# Patient Record
Sex: FEMALE | Race: WHITE | NOT HISPANIC OR LATINO | Employment: PART TIME | URBAN - METROPOLITAN AREA
[De-identification: names, ages, dates, MRNs, and addresses within clinical notes are randomized per-mention and may not be internally consistent; named-entity substitution may affect disease eponyms.]

---

## 2021-05-15 ENCOUNTER — HOSPITAL ENCOUNTER (EMERGENCY)
Facility: HOSPITAL | Age: 36
Discharge: HOME | End: 2021-05-15
Attending: EMERGENCY MEDICINE | Admitting: EMERGENCY MEDICINE
Payer: COMMERCIAL

## 2021-05-15 VITALS
WEIGHT: 134 LBS | TEMPERATURE: 98.5 F | RESPIRATION RATE: 18 BRPM | OXYGEN SATURATION: 99 % | DIASTOLIC BLOOD PRESSURE: 65 MMHG | SYSTOLIC BLOOD PRESSURE: 123 MMHG | HEART RATE: 103 BPM

## 2021-05-15 DIAGNOSIS — R09.89 THROAT FULLNESS: Primary | ICD-10-CM

## 2021-05-15 DIAGNOSIS — R13.10 DIFFICULTY SWALLOWING: ICD-10-CM

## 2021-05-15 DIAGNOSIS — R05.9 COUGH: ICD-10-CM

## 2021-05-15 PROCEDURE — 99284 EMERGENCY DEPT VISIT MOD MDM: CPT | Performed by: EMERGENCY MEDICINE

## 2021-05-15 PROCEDURE — 99283 EMERGENCY DEPT VISIT LOW MDM: CPT

## 2021-05-15 RX ORDER — BENZONATATE 100 MG/1
100 CAPSULE ORAL EVERY 8 HOURS
Qty: 21 CAPSULE | Refills: 0 | Status: SHIPPED | OUTPATIENT
Start: 2021-05-15 | End: 2021-05-22

## 2021-05-15 RX ORDER — BENZONATATE 100 MG/1
100 CAPSULE ORAL EVERY 8 HOURS
Qty: 21 CAPSULE | Refills: 0 | Status: SHIPPED | OUTPATIENT
Start: 2021-05-15 | End: 2021-05-15 | Stop reason: SDUPTHER

## 2021-05-15 RX ORDER — BENZONATATE 100 MG/1
100 CAPSULE ORAL ONCE
Status: COMPLETED | OUTPATIENT
Start: 2021-05-15 | End: 2021-05-15

## 2021-05-15 RX ADMIN — BENZONATATE 100 MG: 100 CAPSULE ORAL at 17:04

## 2021-05-15 RX ADMIN — LIDOCAINE HYDROCHLORIDE 10 ML: 20 SOLUTION ORAL; TOPICAL at 17:04

## 2021-05-15 NOTE — Clinical Note
Ktrandy Bridges was seen and treated in our emergency department on 5/15/2021  Diagnosis:     [facility]    She may return on this date: If you have any questions or concerns, please don't hesitate to call        Bri Birmingham MD    ______________________________           _______________          _______________  Hospital Representative                              Date                                Time

## 2021-05-15 NOTE — ED PROVIDER NOTES
History  Chief Complaint   Patient presents with    Difficulty Swallowing     Pt reports approximately 30 minutes PTA she was coughing hard and since then is having difficulty swallowing  Pt denies introduction of new food/medication      HPI  Patient is a 48-year-old otherwise healthy female presenting for evaluation of throat foreign body sensation and difficulty swallowing starting about 30 minutes prior to arrival in the emergency department  Patient states that for about the last week she has had rhinorrhea, cough, scratchy throat  Patient believes that her symptoms are consistent with seasonal allergies, has been taking Claritin, DayQuil, Flonase at home with limited relief, additionally was prescribed a Z-Pavel a few days ago which she has been taking  Patient states that today she had a particularly bad coughing fit and after this felt fullness in the back of her throat, difficulty swallowing, anxiety  Patient states that her symptoms have been intermittent since that time, denies significant shortness of breath, has been clearing secretions, was able to drink water in her car without issue  Patient states that her symptoms started a bit after swallowing DayQuil  Patient denies any component of chest pain, focal weakness or numbness of any other part of her body, denies any history of allergic reaction other than her seasonal allergies  Patient denies fevers, chills, headache, myalgias, states a slight sore throat, denies recent travel or sick contacts  Prior to Admission Medications   Prescriptions Last Dose Informant Patient Reported? Taking? ustekinumab (STELARA) 45 MG/0 5ML injection  Self Yes Yes   Sig: Inject 45 mg under the skin every 3 (three) months      Facility-Administered Medications: None       History reviewed  No pertinent past medical history  History reviewed  No pertinent surgical history  History reviewed  No pertinent family history    I have reviewed and agree with the history as documented  E-Cigarette/Vaping    E-Cigarette Use Never User      E-Cigarette/Vaping Substances     Social History     Tobacco Use    Smoking status: Never Smoker    Smokeless tobacco: Never Used   Substance Use Topics    Alcohol use: Never     Frequency: Never    Drug use: Never        Review of Systems   Constitutional: Negative for chills, fatigue and fever  HENT: Positive for congestion, postnasal drip, sinus pressure, sore throat, trouble swallowing and voice change  Negative for hearing loss  Eyes: Negative for visual disturbance  Respiratory: Positive for cough and shortness of breath  Negative for chest tightness  Cardiovascular: Negative for chest pain and palpitations  Gastrointestinal: Negative for abdominal distention, abdominal pain, constipation, diarrhea, nausea and vomiting  Musculoskeletal: Negative for arthralgias and myalgias  Skin: Negative for color change and rash  Neurological: Negative for dizziness and headaches  Psychiatric/Behavioral: Negative for confusion  Physical Exam  ED Triage Vitals [05/15/21 1615]   Temperature Pulse Respirations Blood Pressure SpO2   98 5 °F (36 9 °C) 103 18 123/65 99 %      Temp Source Heart Rate Source Patient Position - Orthostatic VS BP Location FiO2 (%)   Oral Monitor -- Right arm --      Pain Score       4             Orthostatic Vital Signs  Vitals:    05/15/21 1615   BP: 123/65   Pulse: 103       Physical Exam  Vitals signs reviewed  Constitutional:       General: She is not in acute distress  Appearance: She is well-developed  She is not diaphoretic  Comments: Well-appearing, nondistressed   HENT:      Head: Normocephalic and atraumatic  Comments: Normal-appearing oropharynx  Some erythema in the posterior oropharynx however no tonsillar swelling, normal uvula  No anterior neck swelling, fullness, tenderness  Normal voice  Clearing secretions       Right Ear: External ear normal       Left Ear: External ear normal       Nose: Nose normal       Mouth/Throat:      Pharynx: No oropharyngeal exudate  Eyes:      Pupils: Pupils are equal, round, and reactive to light  Neck:      Musculoskeletal: Normal range of motion  Cardiovascular:      Rate and Rhythm: Normal rate and regular rhythm  Heart sounds: Normal heart sounds  No murmur  No friction rub  No gallop  Pulmonary:      Effort: Pulmonary effort is normal  No respiratory distress  Breath sounds: Normal breath sounds  No wheezing  Comments: Lungs clear to auscultation bilaterally  Intermittently coughing with a dry nonproductive cough  Chest:      Chest wall: No tenderness  Abdominal:      General: Bowel sounds are normal  There is no distension  Palpations: Abdomen is soft  There is no mass  Tenderness: There is no abdominal tenderness  There is no guarding  Musculoskeletal: Normal range of motion  General: No deformity  Lymphadenopathy:      Cervical: No cervical adenopathy  Skin:     General: Skin is warm and dry  Capillary Refill: Capillary refill takes less than 2 seconds  Neurological:      Mental Status: She is alert and oriented to person, place, and time  Comments: Cranial nerves 2-12 intact  Full strength and sensation bilaterally in upper and lower extremities  ED Medications  Medications   al mag oxide-diphenhydramine-lidocaine viscous (MAGIC MOUTHWASH) suspension 10 mL (10 mL Swish & Swallow Given 5/15/21 1704)   benzonatate (TESSALON PERLES) capsule 100 mg (100 mg Oral Given 5/15/21 1704)       Diagnostic Studies  Results Reviewed     None                 No orders to display         Procedures  Procedures      ED Course                             SBIRT 20yo+      Most Recent Value   SBIRT (25 yo +)   In order to provide better care to our patients, we are screening all of our patients for alcohol and drug use  Would it be okay to ask you these screening questions? No Filed at: 05/15/2021 1707                [facility]  Number of Diagnoses or Management Options  Cough:   Difficulty swallowing:   Throat fullness:   Diagnosis management comments: 13-year-old female with sore throat, foreign body sensation after coughing and recently taking DayQuil, well-appearing with normal vital signs, normal appearing neck and oropharynx on examination  Patient provided with Tessalon Perle, magic mouthwash with moderate improvement of symptoms, discharged with return precautions  Patient Progress  Patient progress: improved      Disposition  Final diagnoses:   Throat fullness   Difficulty swallowing   Cough     Time reflects when diagnosis was documented in both MDM as applicable and the Disposition within this note     Time User Action Codes Description Comment    5/15/2021  4:54 PM Umer Moles Add [R68 89] Throat fullness     5/15/2021  4:55 PM Umer Moles Add [R13 10] Difficulty swallowing     5/15/2021  4:55 PM Umer Moles Add [R05] Cough       ED Disposition     ED Disposition Condition Date/Time Comment    Discharge Stable Sat May 15, 2021  4:54 PM [facility] discharge to home/self care  Follow-up Information    None         Discharge Medication List as of 5/15/2021  5:41 PM      START taking these medications    Details   benzonatate (TESSALON PERLES) 100 mg capsule Take 1 capsule (100 mg total) by mouth every 8 (eight) hours for 7 days, Starting Sat 5/15/2021, Until Sat 5/22/2021, Print         CONTINUE these medications which have NOT CHANGED    Details   ustekinumab (STELARA) 45 MG/0 5ML injection Inject 45 mg under the skin every 3 (three) months, Historical Med           No discharge procedures on file  PDMP Review     None           ED Provider  Attending physically available and evaluated [facility]  I managed the patient along with the ED Attending      Electronically Signed by         Romulo Worthy MD  05/16/21 5581

## 2021-05-15 NOTE — DISCHARGE INSTRUCTIONS
Continue with the prescribed Tessalon Perles for relief of your cough  Additionally we recommend taking a few spoonfuls of honey per day for relief of your sore throat and cough  If you are unable to tolerate your secretions, if you develop worsening chest pain, or progressive shortness of breath, return to the emergency department  Follow-up with your primary care provider in the next week for further evaluation

## 2021-05-15 NOTE — Clinical Note
Russell Hart was seen and treated in our emergency department on 5/15/2021  Diagnosis:     [facility]    She may return on this date: If you have any questions or concerns, please don't hesitate to call        Hema Zuluaga MD    ______________________________           _______________          _______________  Hospital Representative                              Date                                Time

## 2025-02-23 ENCOUNTER — HOSPITAL ENCOUNTER (EMERGENCY)
Dept: HOSPITAL 99 - EMR | Age: 40
Discharge: HOME | End: 2025-02-23
Payer: COMMERCIAL

## 2025-02-23 VITALS — SYSTOLIC BLOOD PRESSURE: 123 MMHG | DIASTOLIC BLOOD PRESSURE: 68 MMHG | RESPIRATION RATE: 15 BRPM

## 2025-02-23 VITALS — DIASTOLIC BLOOD PRESSURE: 75 MMHG | SYSTOLIC BLOOD PRESSURE: 104 MMHG | RESPIRATION RATE: 20 BRPM

## 2025-02-23 VITALS — RESPIRATION RATE: 15 BRPM

## 2025-02-23 VITALS — RESPIRATION RATE: 18 BRPM | DIASTOLIC BLOOD PRESSURE: 70 MMHG | SYSTOLIC BLOOD PRESSURE: 112 MMHG

## 2025-02-23 VITALS — RESPIRATION RATE: 16 BRPM

## 2025-02-23 VITALS — DIASTOLIC BLOOD PRESSURE: 63 MMHG | SYSTOLIC BLOOD PRESSURE: 94 MMHG

## 2025-02-23 VITALS — RESPIRATION RATE: 12 BRPM

## 2025-02-23 VITALS — RESPIRATION RATE: 9 BRPM

## 2025-02-23 VITALS — RESPIRATION RATE: 17 BRPM

## 2025-02-23 DIAGNOSIS — Z86.16: ICD-10-CM

## 2025-02-23 DIAGNOSIS — R07.89: Primary | ICD-10-CM

## 2025-02-23 DIAGNOSIS — K50.90: ICD-10-CM

## 2025-02-23 LAB
ALBUMIN SERPL-MCNC: 4.1 G/DL (ref 3.5–5)
ALP SERPL-CCNC: 93 U/L (ref 38–126)
ALT SERPL-CCNC: 17 U/L (ref 0–35)
AST SERPL-CCNC: 23 U/L (ref 14–36)
BUN SERPL-MCNC: 15 MG/DL (ref 7–17)
CALCIUM SERPL-MCNC: 8.8 MG/DL (ref 8.4–10.2)
CHLORIDE SERPL-SCNC: 101 MMOL/L (ref 98–107)
CO2 SERPL-SCNC: 26 MMOL/L (ref 22–30)
D-DIMER: < 0.27 UG/MLFEU (ref 0–0.5)
EGFR: > 60
ERYTHROCYTE [DISTWIDTH] IN BLOOD BY AUTOMATED COUNT: 12.2 % (ref 11.5–14.5)
GLUCOSE SERPL-MCNC: 120 MG/DL (ref 70–99)
HCT VFR BLD AUTO: 38.8 % (ref 37–47)
HGB BLD-MCNC: 13.2 G/DL (ref 12–16)
MCHC RBC AUTO-ENTMCNC: 34 G/DL (ref 33–37)
MCV RBC AUTO: 89 FL (ref 81–99)
NRBC BLD AUTO-RTO: 0 %
PLATELET # BLD AUTO: 248 10^3/UL (ref 130–400)
POTASSIUM SERPL-SCNC: 4 MMOL/L (ref 3.5–5.1)
PROT SERPL-MCNC: 6.8 G/DL (ref 6.3–8.2)
SODIUM SERPL-SCNC: 136 MMOL/L (ref 135–145)
TROPONIN I SERPL-MCNC: < 0.012 NG/ML

## 2025-02-23 PROCEDURE — 99284 EMERGENCY DEPT VISIT MOD MDM: CPT

## 2025-02-23 RX ADMIN — CYCLOBENZAPRINE HYDROCHLORIDE 10 MG: 10 TABLET, FILM COATED ORAL at 08:50

## 2025-02-23 RX ADMIN — IBUPROFEN 600 MG: 600 TABLET, FILM COATED ORAL at 06:36
